# Patient Record
Sex: FEMALE | Race: BLACK OR AFRICAN AMERICAN | NOT HISPANIC OR LATINO | Employment: OTHER | URBAN - METROPOLITAN AREA
[De-identification: names, ages, dates, MRNs, and addresses within clinical notes are randomized per-mention and may not be internally consistent; named-entity substitution may affect disease eponyms.]

---

## 2017-01-02 ENCOUNTER — TELEPHONE (OUTPATIENT)
Dept: FAMILY MEDICINE | Facility: CLINIC | Age: 72
End: 2017-01-02

## 2017-01-02 RX ORDER — CIPROFLOXACIN 500 MG/1
500 TABLET ORAL 2 TIMES DAILY
Qty: 20 TABLET | Refills: 0 | Status: SHIPPED | OUTPATIENT
Start: 2017-01-02 | End: 2017-01-03

## 2017-01-03 ENCOUNTER — TELEPHONE (OUTPATIENT)
Dept: FAMILY MEDICINE | Facility: CLINIC | Age: 72
End: 2017-01-03

## 2017-01-03 DIAGNOSIS — N30.00 ACUTE CYSTITIS WITHOUT HEMATURIA: Primary | ICD-10-CM

## 2017-01-03 RX ORDER — NITROFURANTOIN 25; 75 MG/1; MG/1
100 CAPSULE ORAL 2 TIMES DAILY
Qty: 20 CAPSULE | Refills: 0 | Status: SHIPPED | OUTPATIENT
Start: 2017-01-03 | End: 2017-01-13

## 2017-01-03 NOTE — TELEPHONE ENCOUNTER
Daughter notified. Stated she picked up the medication this morning and started patient on it. Patient will come in in 2 weeks for follow up urine

## 2017-01-03 NOTE — TELEPHONE ENCOUNTER
Left message informing daughter of new medication    UA and UC pending schedule for 2 weeks when signed

## 2017-01-03 NOTE — TELEPHONE ENCOUNTER
The bacteria in her urine are resistant to cipro so I will change this to nitrofurantoin.  Call her daughter Hallie to tell her to change.  Recheck a Urinalysis and culture in 2 weeks.    Orders Placed This Encounter   Procedures    Urine culture     Check this 2 weeks after a UTI to get proof of cure.     Standing Status:   Future     Standing Expiration Date:   3/4/2018    Urinalysis     Check this 2 weeks after a UTI to get proof of cure.     Standing Status:   Future     Standing Expiration Date:   1/3/2018       Medications Ordered This Encounter      nitrofurantoin, macrocrystal-monohydrate, (MACROBID) 100 MG capsule          Sig: Take 1 capsule (100 mg total) by mouth 2 (two) times daily.          Dispense:  20 capsule          Refill:  0

## 2017-01-10 NOTE — TELEPHONE ENCOUNTER
I have signed for the following orders AND/OR meds.  Please call the patient and ask the patient to schedule the testing AND/OR inform about any medications that were sent.      Orders Placed This Encounter   Procedures    Urine culture     Check this 2 weeks after a UTI to get proof of cure.     Standing Status:   Future     Standing Expiration Date:   3/4/2018    Urine culture     Standing Status:   Future     Standing Expiration Date:   3/10/2018    Urinalysis     Check this 2 weeks after a UTI to get proof of cure.     Standing Status:   Future     Standing Expiration Date:   1/3/2018    URINALYSIS     Standing Status:   Future     Standing Expiration Date:   3/10/2018         Medications Ordered This Encounter      nitrofurantoin, macrocrystal-monohydrate, (MACROBID) 100 MG capsule          Sig: Take 1 capsule (100 mg total) by mouth 2 (two) times daily.          Dispense:  20 capsule          Refill:  0

## 2017-01-31 ENCOUNTER — TELEPHONE (OUTPATIENT)
Dept: FAMILY MEDICINE | Facility: CLINIC | Age: 72
End: 2017-01-31

## 2017-01-31 NOTE — TELEPHONE ENCOUNTER
----- Message from Magdalena Warner sent at 1/31/2017  9:16 AM CST -----  Contact: daughter - Hallie  [phone]  Pt has been leaning to the right and seems to be weak on that side but does not appear to be in pain.  Pt does seem to be off some how possibly worsening of her dementia.  I scheduled her for first available in [place] with Dr Frost today at 2:20 but Ms Sterling would like to know if there is anyone who could possibly see her this morning some time.  Please call Ms Sterling ASAP this morning at [phone] to offer possible appt access.

## 2017-02-01 ENCOUNTER — TELEPHONE (OUTPATIENT)
Dept: FAMILY MEDICINE | Facility: CLINIC | Age: 72
End: 2017-02-01

## 2017-02-01 NOTE — TELEPHONE ENCOUNTER
----- Message from Karon Magallon sent at 2/1/2017 11:08 AM CST -----  Contact: pt daughter - sarah   States she needs to have orders for an in and out ua due to pt having an UTI a few weeks ago and can be reached at [phone]//thanks/dbw

## 2017-02-16 ENCOUNTER — TELEPHONE (OUTPATIENT)
Dept: FAMILY MEDICINE | Facility: CLINIC | Age: 72
End: 2017-02-16

## 2017-02-16 NOTE — TELEPHONE ENCOUNTER
----- Message from Nemo Gallo sent at 2/15/2017  1:34 PM CST -----  Contact: Pt Daughter/Hallie  Caller request call from nurse to get an order to get pt a wheelchair High back that reclines, please contact caller at [phone]

## 2017-03-17 ENCOUNTER — TELEPHONE (OUTPATIENT)
Dept: FAMILY MEDICINE | Facility: CLINIC | Age: 72
End: 2017-03-17

## 2017-03-17 NOTE — TELEPHONE ENCOUNTER
Check a cmp, cbc, urinalysis and urine culture now.  This is done through her home heatlh.  Call me report today.

## 2017-03-17 NOTE — TELEPHONE ENCOUNTER
----- Message from Magdalena Wraner sent at 3/16/2017  4:54 PM CDT -----  Contact: Cristina - caregiver  [phone] or [phone]  Pt has been running fever at night and having runny nose and she has been lethargic much like the last time you did labs and she ended up having an infection so they would like you to order blood and urine labs ploease.

## 2017-03-17 NOTE — TELEPHONE ENCOUNTER
Left verbal orders with elena at home health they state they will do it this evening and call with results

## 2017-03-20 ENCOUNTER — TELEPHONE (OUTPATIENT)
Dept: FAMILY MEDICINE | Facility: CLINIC | Age: 72
End: 2017-03-20

## 2017-03-20 NOTE — TELEPHONE ENCOUNTER
----- Message from Cristina Canales sent at 3/17/2017  1:49 PM CDT -----  Contact: Connie/[facility]  States she needs to speak to Duyen regarding a signed lab order with diagnosis, the patient is going to the hospital to have it done. Please fax to [phone].  Soon would be better than later if possible. Please call Connie at [phone]. Thank you

## 2017-03-29 RX ORDER — AMOXICILLIN AND CLAVULANATE POTASSIUM 875; 125 MG/1; MG/1
1 TABLET, FILM COATED ORAL 2 TIMES DAILY
Qty: 20 TABLET | Refills: 0 | Status: CANCELLED | OUTPATIENT
Start: 2017-03-29

## 2017-03-29 NOTE — TELEPHONE ENCOUNTER
----- Message from Ami Coffey sent at 3/29/2017  2:30 PM CDT -----  Contact: Hallie/daughter  Hallie called and stated that someone called her this morning and advised her that a prescription will be called into the pharmacy. She has been calling the pharmacy and sent someone to the pharmacy and nothing is there for her.     [facility] [address] [address]  Phone: [phone] Fax: [phone]    She can be contacted at [phone].    Thanks,  Ami

## 2017-03-29 NOTE — TELEPHONE ENCOUNTER
Patient had a UTI per DR. Frost from outside labs at [place]. I sent in her prescription, and notified her daughter. I spoke with Lesia, at [facility], and advised per verbal order to do a urinalysis with culture and sensitivity in 2 weeks. She verbalized understanding.

## 2017-03-30 RX ORDER — AMOXICILLIN AND CLAVULANATE POTASSIUM 875; 125 MG/1; MG/1
1 TABLET, FILM COATED ORAL 2 TIMES DAILY
Qty: 20 TABLET | Refills: 0 | Status: SHIPPED | OUTPATIENT
Start: 2017-03-30 | End: 2017-04-26

## 2017-04-04 RX ORDER — FERROUS GLUCONATE 324(38)MG
TABLET ORAL
Qty: 28 TABLET | Refills: 11 | Status: SHIPPED | OUTPATIENT
Start: 2017-04-04 | End: 2017-10-18 | Stop reason: SDUPTHER

## 2017-04-04 RX ORDER — FOLIC ACID 1 MG/1
TABLET ORAL
Qty: 28 TABLET | Refills: 11 | Status: SHIPPED | OUTPATIENT
Start: 2017-04-04 | End: 2017-10-18 | Stop reason: SDUPTHER

## 2017-04-25 ENCOUNTER — TELEPHONE (OUTPATIENT)
Dept: FAMILY MEDICINE | Facility: CLINIC | Age: 72
End: 2017-04-25

## 2017-04-25 NOTE — TELEPHONE ENCOUNTER
----- Message from Sabine Carlos sent at 4/25/2017 10:02 AM CDT -----  Contact: devendra-daughter  bloodwork results needed, also has blemishes & warm (was in hosp for 2 mths last time this happene). pls adv..[phone]

## 2017-04-25 NOTE — TELEPHONE ENCOUNTER
Patient is being seen tomorrow for rash. Patient's daughter is wanting results from blood work taken at hospital

## 2017-04-26 ENCOUNTER — OFFICE VISIT (OUTPATIENT)
Dept: FAMILY MEDICINE | Facility: CLINIC | Age: 72
End: 2017-04-26
Payer: MEDICARE

## 2017-04-26 DIAGNOSIS — L30.9 DERMATITIS: Primary | ICD-10-CM

## 2017-04-26 PROCEDURE — 99213 OFFICE O/P EST LOW 20 MIN: CPT | Mod: PBBFAC,PO

## 2017-04-26 PROCEDURE — 99999 PR PBB SHADOW E&M-EST. PATIENT-LVL III: CPT | Mod: PBBFAC,,,

## 2017-04-26 PROCEDURE — 99212 OFFICE O/P EST SF 10 MIN: CPT | Mod: S$PBB,,,

## 2017-04-26 RX ORDER — CLOTRIMAZOLE AND BETAMETHASONE DIPROPIONATE 10; .64 MG/G; MG/G
CREAM TOPICAL 2 TIMES DAILY
Qty: 45 G | Refills: 0 | Status: SHIPPED | OUTPATIENT
Start: 2017-04-26 | End: 2017-05-10

## 2017-04-26 NOTE — PROGRESS NOTES
Subjective:       Patient ID: Makeda Chowdhury is a 72 y.o. female.    Chief Complaint: Rash    HPI   The patient presents with her daughter who states that the patient has had a rash for a bout one week. The says the rash is constant and mild. She says the patietn has been scratching at the rash.     Review of Systems   Unable to perform ROS: Dementia         Objective:      Physical Exam   Constitutional: She appears well-developed and well-nourished. She is uncooperative.   HENT:   Head: Normocephalic and atraumatic.   Eyes: Conjunctivae are normal. Pupils are equal, round, and reactive to light. No scleral icterus.   Neck: Normal range of motion. Neck supple.   Cardiovascular: Normal rate, regular rhythm, normal heart sounds and intact distal pulses.  Exam reveals no gallop and no friction rub.    No murmur heard.  Pulmonary/Chest: Effort normal and breath sounds normal. No respiratory distress. She has no wheezes. She has no rales. She exhibits no tenderness.   Musculoskeletal: Normal range of motion.   Lymphadenopathy:     She has no cervical adenopathy.   Neurological: She is alert. She is disoriented. She exhibits normal muscle tone. Coordination normal.   Skin: Skin is warm and dry. Rash noted. Rash is maculopapular. No erythema. No pallor.        Psychiatric: She is noncommunicative.   The exhibits non-communicative speech. She mumbles barely discernable words and is unable to convey a message.    Vitals reviewed.      Assessment:       1. Dermatitis          Plan:   Dermatitis  -     clotrimazole-betamethasone 1-0.05% (LOTRISONE) cream; Apply topically 2 (two) times daily.  Dispense: 45 g; Refill: 0        -     Advised her daughter to use a skin protective balm on areas with rash        Disclaimer: This note is prepared using voice recognition software.  As such there may be errors in the dictation.  It has not been proofread.

## 2017-04-28 ENCOUNTER — TELEPHONE (OUTPATIENT)
Dept: FAMILY MEDICINE | Facility: CLINIC | Age: 72
End: 2017-04-28

## 2017-04-28 NOTE — TELEPHONE ENCOUNTER
Pt was seen by alma delia on 4/26/17    She states the rash is getting worse and she has phase 2 pressure ulcer on her sacrum area. She states they are using ointment but does not seem to be helping. They think she is running fever in the evening. They are unsure what is going on with her. They said she is more fatigued than normal and her urine output has decreased. The daughter wants labs done. The nurse thinks they should take her to [facility].

## 2017-04-28 NOTE — TELEPHONE ENCOUNTER
----- Message from Pari Maddox sent at 4/28/2017  2:40 PM CDT -----  Contact: devendra/ frances  [phone]  States that she needs to speak to nurse regarding pt. States that she would like labs order for pt. States that pt has broken out in a rash that is blisters and a stage 2 pressure sore in sacral area. Also states that pt bottom was red and warm to touch. Please call back at [phone]//thank you acc

## 2017-05-04 DIAGNOSIS — N39.0 RECURRENT UTI: Primary | ICD-10-CM

## 2017-05-04 RX ORDER — SULFAMETHOXAZOLE AND TRIMETHOPRIM 800; 160 MG/1; MG/1
1 TABLET ORAL 2 TIMES DAILY
Qty: 20 TABLET | Refills: 0 | Status: SHIPPED | OUTPATIENT
Start: 2017-05-04 | End: 2017-10-23

## 2017-05-04 NOTE — TELEPHONE ENCOUNTER
Pt daughter states Patient is seeing Dr. White for urology already. I will fax these results to his office and pt daughter will call him and have ms stark seen.       MEDICATION PENDING PER PAPERWORK

## 2017-05-05 NOTE — TELEPHONE ENCOUNTER
I have signed for the following orders AND/OR meds.  Please call the patient and ask the patient to schedule the testing AND/OR inform about any medications that were sent.      Orders Placed This Encounter   Procedures    Ambulatory consult to Urology     Referral Priority:   Routine     Referral Type:   Consultation     Referral Reason:   Specialty Services Required     Requested Specialty:   Urology     Number of Visits Requested:   1         Medications Ordered This Encounter      sulfamethoxazole-trimethoprim 800-160mg (BACTRIM DS) 800-160 mg Tab          Sig: Take 1 tablet by mouth 2 (two) times daily.          Dispense:  20 tablet          Refill:  0

## 2017-05-11 ENCOUNTER — TELEPHONE (OUTPATIENT)
Dept: FAMILY MEDICINE | Facility: CLINIC | Age: 72
End: 2017-05-11

## 2017-05-11 NOTE — TELEPHONE ENCOUNTER
----- Message from Vanessa Mcallister sent at 5/11/2017  9:58 AM CDT -----  Contact: Hallie/daughter  Hallie would like to know if Dr. Frost can order Rehab, Please call her at [phone].    Thanks  td

## 2017-05-16 ENCOUNTER — TELEPHONE (OUTPATIENT)
Dept: FAMILY MEDICINE | Facility: CLINIC | Age: 72
End: 2017-05-16

## 2017-05-16 NOTE — TELEPHONE ENCOUNTER
Patients daughter susan would like a phone call from you to discuss what is the next best step for Ms. Ashford. She just came out of the hospital.

## 2017-05-16 NOTE — TELEPHONE ENCOUNTER
I spent 17 minutes with her daughter going through the [facility] chart with her explaining what it appears that she is being treated for at this time.  Pneumonia and UTI are being treated along with alzheimer's and CKD.    She is going to be discharged.  Hallie is going to call [facility] to see what they have to offer as far as rehab in the future after her infection is better.

## 2017-05-16 NOTE — TELEPHONE ENCOUNTER
----- Message from Liliana Lopes sent at 5/16/2017  8:47 AM CDT -----  Contact: Hallie/daughter  Hallie is requesting to speak to the nurse regarding having pt placed in a nursing rehabilitation facility. Pt is being discharged from the hospital today. Pls call Hallie back at [phone].

## 2017-05-22 ENCOUNTER — PATIENT OUTREACH (OUTPATIENT)
Dept: ADMINISTRATIVE | Facility: CLINIC | Age: 72
End: 2017-05-22
Payer: MEDICARE

## 2017-05-22 NOTE — PROGRESS NOTES
Discharge Information     Discharge Date:  5/16/17          Primary Discharge Diagnosis:  Urinary tract infection          Chelsea Gutierrez RN attempted to contact patient. No answer. The following message was left for the patient to return the call:  Good morning,  I am a nurse calling on behalf of [facility] from the [facility].  This is a Transitional Care Call for Makeda Chowdhury. When you have a moment please contact us at [phone] or [phone] Monday through Friday, between the hours of 8 am to 4 pm. We look forward to speaking with you. On behalf of [facility] have a nice day.    The patient has a scheduled HOSFU appointment with Inessa Bowens on 5/25/17 @ [id number]. Message sent to Physician staff.

## 2017-05-31 ENCOUNTER — TELEPHONE (OUTPATIENT)
Dept: FAMILY MEDICINE | Facility: CLINIC | Age: 72
End: 2017-05-31

## 2017-05-31 RX ORDER — NYSTATIN 100000 [USP'U]/G
POWDER TOPICAL 4 TIMES DAILY
Qty: 60 G | Refills: 2 | Status: SHIPPED | OUTPATIENT
Start: 2017-05-31 | End: 2017-10-23 | Stop reason: SDUPTHER

## 2017-05-31 NOTE — TELEPHONE ENCOUNTER
----- Message from Mgadalena Warner sent at 5/31/2017  2:45 PM CDT -----  Contact: daughter - Hallie  Pt was supposed to have a hosp f/u appt yest but they just couldn't get her ready and out of the house to make it there because it is just getting harder and harder to get her any where.  Ms Sterling is wondering if Home Health orders could be sent to [facility] to collect the U/A to check for the UTI f/u?  Please call Ms Sterling back at [phone]  She also needs a refill on Nystatin Powder and the eyedrops for her eye for pink eye sent to the [facility] in [place].

## 2017-05-31 NOTE — TELEPHONE ENCOUNTER
----- Message from Jia Bender sent at 5/31/2017  2:45 PM CDT -----  Ingris with [facility] at [phone]//[facility] is calling to get an order to do and in and out catheter to obtain UA/ C & S//Please Call//thanks/lh

## 2017-06-01 ENCOUNTER — TELEPHONE (OUTPATIENT)
Dept: FAMILY MEDICINE | Facility: CLINIC | Age: 72
End: 2017-06-01

## 2017-06-01 RX ORDER — FLUCONAZOLE 100 MG/1
100 TABLET ORAL DAILY
Qty: 10 TABLET | Refills: 0 | Status: SHIPPED | OUTPATIENT
Start: 2017-06-01 | End: 2017-06-08

## 2017-06-01 RX ORDER — CLOTRIMAZOLE AND BETAMETHASONE DIPROPIONATE 10; .64 MG/G; MG/G
CREAM TOPICAL 2 TIMES DAILY
Qty: 45 G | OUTPATIENT
Start: 2017-06-01 | End: 2017-10-23 | Stop reason: SDUPTHER

## 2017-06-01 NOTE — TELEPHONE ENCOUNTER
Ask her to consider using a dermatologist if the skin is sloughing off at this time.        I have signed for the following orders AND/OR meds.  Please call the patient and ask the patient to schedule the testing AND/OR inform about any medications that were sent.      No orders of the defined types were placed in this encounter.        Medications Ordered This Encounter      clotrimazole-betamethasone 1-0.05% (LOTRISONE) cream          Sig: Apply topically 2 (two) times daily.          Dispense:  45 g          Refill:  1d      fluconazole (DIFLUCAN) 100 MG tablet          Sig: Take 1 tablet (100 mg total) by mouth once daily.          Dispense:  10 tablet          Refill:  0

## 2017-06-01 NOTE — TELEPHONE ENCOUNTER
Patient daughter thinks that patient may have a yeast infection. When they bathe the patient she has white caked up under her arms and between her legs and when wiping that off they said it was as if her skin was being rubbed off too. Please advise

## 2017-06-01 NOTE — TELEPHONE ENCOUNTER
----- Message from Pari Maddox sent at 6/1/2017 11:14 AM CDT -----  Contact: susan/daughter [phone]  States that she called and left message yesterday and no one called her back. States the pt needs orders for a UA sent to [facility]. States that pt needs refill on nystat powder and medication for her eye. Please call back at [phone]//thank you acc

## 2017-06-02 ENCOUNTER — TELEPHONE (OUTPATIENT)
Dept: FAMILY MEDICINE | Facility: CLINIC | Age: 72
End: 2017-06-02

## 2017-06-02 NOTE — TELEPHONE ENCOUNTER
----- Message from Karon Magallon sent at 6/2/2017 10:09 AM CDT -----  Contact: pt daughter - susan   States she is rtn nurses call and can be reached at [phone]//thanks/dbw

## 2017-06-02 NOTE — TELEPHONE ENCOUNTER
Patient caretaker states she needs a mattress for the pressure uclers  She also needs some drops for her eyes because they are draining  She also wants a pad to put under patient for cushion.  She states she can not bring the patient up here because she can not get her in and out of the house.   Please advise                     Please approve for her to get a mattress and pad for her pressure ulcers through the home health company.  They should be turning her every 2 hours.  Please ask the family if they have someone to do that and if not, she should be evaluated for placement in a facility to care for her appropriately.      As for the eyes, use over the counter saline eye drops that.  If an RX can be called in for this, use 1 squirt every 6 hours for the eyes.

## 2017-06-02 NOTE — TELEPHONE ENCOUNTER
Patient caretaker states she needs a mattress for the pressure uclers  She also needs some drops for her eyes because they are draining  She also wants a pad to put under patient for cushion.  She states she can not bring the patient up here because she can not get her in and out of the house.   Please advise

## 2017-06-05 NOTE — TELEPHONE ENCOUNTER
I do not know because I do not know what she has.  This really needs to be evaluated because this is not normal.

## 2017-06-05 NOTE — TELEPHONE ENCOUNTER
"susan patient's caretaker states that they have someone to turn her every 6 hours but she still has these issues. They want an order for a BED and a MATTRESS.   Also, they state they have been using over the counter eye drops and you can still see the  "cold" draining in from her eyes  "

## 2017-06-05 NOTE — TELEPHONE ENCOUNTER
OTC saline eye drops.  If they have used that, see an eye doc.    Ask her to consider hospice given her bed sores and general debilitation at this point.

## 2017-06-05 NOTE — TELEPHONE ENCOUNTER
Every 6 hours is not standard of care.  It must be every 2 hours so this is why she has bed sores.  She is not being turned enough.  I approved the order for the bed last week.  Where is the order and who is addressing it?   She may need to be moved to a higher level of care than she can be rendered in a home setting at this time.  Ask susan to consider this.    I do not know what to do more about cold coming from the eyes. She needs to be evaluated to tell more.  Who is her eye doc?

## 2017-06-05 NOTE — TELEPHONE ENCOUNTER
I apologize that was an error on my end she is being turned every 2 hours. What was the rx you said you can call in for her eyes in the previous messages. You told me 1 squirt every 6 hours, but you did not tell me what med.

## 2017-06-06 ENCOUNTER — TELEPHONE (OUTPATIENT)
Dept: FAMILY MEDICINE | Facility: CLINIC | Age: 72
End: 2017-06-06

## 2017-06-06 NOTE — TELEPHONE ENCOUNTER
Gave verbal order for mattress and pad to trudy figueredo [facility]

## 2017-06-08 ENCOUNTER — TELEPHONE (OUTPATIENT)
Dept: FAMILY MEDICINE | Facility: CLINIC | Age: 72
End: 2017-06-08

## 2017-06-08 DIAGNOSIS — F02.80 ALZHEIMER'S DEMENTIA WITHOUT BEHAVIORAL DISTURBANCE, UNSPECIFIED TIMING OF DEMENTIA ONSET: Primary | ICD-10-CM

## 2017-06-08 DIAGNOSIS — G30.9 ALZHEIMER'S DEMENTIA WITHOUT BEHAVIORAL DISTURBANCE, UNSPECIFIED TIMING OF DEMENTIA ONSET: Primary | ICD-10-CM

## 2017-06-09 NOTE — TELEPHONE ENCOUNTER
I have signed for the following orders AND/OR meds.  Please call the patient and ask the patient to schedule the testing AND/OR inform about any medications that were sent.      Orders Placed This Encounter   Procedures    Ambulatory referral to Hospice     Referral Priority:   Routine     Referral Type:   Consultation     Referral Reason:   Specialty Services Required     Referred to Provider:   [facility]     Requested Specialty:   Hospice and Palliative Medicine     Number of Visits Requested:   1

## 2017-06-26 RX ORDER — QUETIAPINE FUMARATE 50 MG/1
TABLET, FILM COATED ORAL
Qty: 56 TABLET | Refills: 0 | Status: SHIPPED | OUTPATIENT
Start: 2017-06-26 | End: 2017-07-24 | Stop reason: SDUPTHER

## 2017-06-26 RX ORDER — OMEPRAZOLE 20 MG/1
CAPSULE, DELAYED RELEASE ORAL
Qty: 28 CAPSULE | Refills: 0 | Status: SHIPPED | OUTPATIENT
Start: 2017-06-26 | End: 2017-07-24 | Stop reason: SDUPTHER

## 2017-06-26 RX ORDER — LISINOPRIL 2.5 MG/1
TABLET ORAL
Qty: 28 TABLET | Refills: 0 | Status: SHIPPED | OUTPATIENT
Start: 2017-06-26 | End: 2017-07-24 | Stop reason: SDUPTHER

## 2017-06-26 RX ORDER — TRAZODONE HYDROCHLORIDE 50 MG/1
TABLET ORAL
Qty: 28 TABLET | Refills: 0 | Status: SHIPPED | OUTPATIENT
Start: 2017-06-26 | End: 2017-07-24 | Stop reason: SDUPTHER

## 2017-06-26 RX ORDER — METOPROLOL SUCCINATE 100 MG/1
TABLET, EXTENDED RELEASE ORAL
Qty: 28 TABLET | Refills: 0 | Status: SHIPPED | OUTPATIENT
Start: 2017-06-26 | End: 2017-07-24 | Stop reason: SDUPTHER

## 2017-06-26 RX ORDER — LEVOTHYROXINE SODIUM 25 UG/1
TABLET ORAL
Qty: 28 TABLET | Refills: 0 | Status: SHIPPED | OUTPATIENT
Start: 2017-06-26 | End: 2017-07-24 | Stop reason: SDUPTHER

## 2017-06-26 RX ORDER — CLONIDINE HYDROCHLORIDE 0.1 MG/1
TABLET ORAL
Qty: 56 TABLET | Refills: 0 | Status: SHIPPED | OUTPATIENT
Start: 2017-06-26 | End: 2017-07-24 | Stop reason: SDUPTHER

## 2017-06-26 RX ORDER — LEVETIRACETAM 500 MG/1
TABLET ORAL
Qty: 56 TABLET | Refills: 0 | Status: SHIPPED | OUTPATIENT
Start: 2017-06-26 | End: 2017-07-24 | Stop reason: SDUPTHER

## 2017-06-28 NOTE — TELEPHONE ENCOUNTER
Patient is now on hospice. Would you like for me to send the lab orders to the hospice nurse and have her draw these?

## 2017-07-25 RX ORDER — METOPROLOL SUCCINATE 100 MG/1
TABLET, EXTENDED RELEASE ORAL
Qty: 28 TABLET | Refills: 11 | Status: SHIPPED | OUTPATIENT
Start: 2017-07-25

## 2017-07-25 RX ORDER — TRAZODONE HYDROCHLORIDE 50 MG/1
TABLET ORAL
Qty: 28 TABLET | Refills: 11 | Status: SHIPPED | OUTPATIENT
Start: 2017-07-25

## 2017-07-25 RX ORDER — LEVETIRACETAM 500 MG/1
TABLET ORAL
Qty: 56 TABLET | Refills: 11 | Status: SHIPPED | OUTPATIENT
Start: 2017-07-25

## 2017-07-25 RX ORDER — LEVOTHYROXINE SODIUM 25 UG/1
TABLET ORAL
Qty: 28 TABLET | Refills: 11 | Status: SHIPPED | OUTPATIENT
Start: 2017-07-25

## 2017-07-25 RX ORDER — CLONIDINE HYDROCHLORIDE 0.1 MG/1
TABLET ORAL
Qty: 56 TABLET | Refills: 11 | Status: SHIPPED | OUTPATIENT
Start: 2017-07-25

## 2017-07-25 RX ORDER — QUETIAPINE FUMARATE 50 MG/1
TABLET, FILM COATED ORAL
Qty: 56 TABLET | Refills: 11 | Status: SHIPPED | OUTPATIENT
Start: 2017-07-25

## 2017-07-25 RX ORDER — LISINOPRIL 2.5 MG/1
TABLET ORAL
Qty: 28 TABLET | Refills: 11 | Status: SHIPPED | OUTPATIENT
Start: 2017-07-25

## 2017-07-25 RX ORDER — OMEPRAZOLE 20 MG/1
CAPSULE, DELAYED RELEASE ORAL
Qty: 28 CAPSULE | Refills: 11 | Status: SHIPPED | OUTPATIENT
Start: 2017-07-25

## 2017-10-18 RX ORDER — FOLIC ACID 1 MG/1
TABLET ORAL
Qty: 28 TABLET | Refills: 0 | Status: SHIPPED | OUTPATIENT
Start: 2017-10-18 | End: 2017-11-14 | Stop reason: SDUPTHER

## 2017-10-18 RX ORDER — FERROUS GLUCONATE 324(38)MG
TABLET ORAL
Qty: 28 TABLET | Refills: 0 | Status: SHIPPED | OUTPATIENT
Start: 2017-10-18 | End: 2017-10-19 | Stop reason: SDUPTHER

## 2017-10-18 RX ORDER — LORATADINE 10 MG/1
TABLET ORAL
Qty: 28 TABLET | Refills: 0 | Status: SHIPPED | OUTPATIENT
Start: 2017-10-18 | End: 2017-10-19

## 2017-10-19 RX ORDER — FERROUS GLUCONATE 324(38)MG
TABLET ORAL
Qty: 28 TABLET | Refills: 11 | Status: SHIPPED | OUTPATIENT
Start: 2017-10-19

## 2017-10-19 RX ORDER — LORATADINE 10 MG/1
TABLET ORAL
Qty: 28 TABLET | Refills: 11 | Status: SHIPPED | OUTPATIENT
Start: 2017-10-19

## 2017-10-19 NOTE — TELEPHONE ENCOUNTER
After Visit Summary   8/29/2017    Hannah Choudhary    MRN: [id number]           Patient Information     Date Of Birth          1947        Visit Information        Provider Department      8/29/2017 11:15 AM Grey Martinez APRN CNP [facility]        Care Instructions    August 29, 2017    Dear Ms. Hannah Choudhary,    Thank you for coming today.  During your visit, we have discussed the following:     __  Continue taking Aricept 10 mg daily.   __  Continue using walker all the time.   __  Call Felicitas Biggs, our  to get more resources in getting help around the house.   __  Return in 6 months. You may return sooner as needed.      For questions, you may send us a [employer] message or call [phone]    Fax number: [phone]    GREG Chaney CNP  [facility]            Follow-ups after your visit        Your next 10 appointments already scheduled     Mar 06, 2018 11:15 AM CST   (Arrive by 11:00 AM)   Return Movement Disorder with GREG Alvarado CNP   [facility] ([facility])    [address] [place] [id number] [phone]              Who to contact     Please call your clinic at [phone] to:    Ask questions about your health    Make or cancel appointments    Discuss your medicines    Learn about your test results    Speak to your doctor   If you have compliments or concerns about an experience at your clinic, or if you wish to file a complaint, please contact [facility] Patient Relations at [phone] or email us at [email or web address]         Additional Information About Your Visit        [employer] Information     [employer] is an electronic gateway that provides easy, online access to your medical records. With [employer] you can request a clinic appointment, read your test results, renew a prescription or communicate with your care team.    I have refilled the patient's requested medication x 1 month.  However, the patient is due for an evaluation in the office.  Call the patient on the phone and book the patient with EITHER Rogelio Daniels NP or Himanshu Wells NP for a visit.    PLEASE DOCUMENT THE FACT THAT YOU HAVE CONTACTED THE PATIENT IN THE CHART FOR FUTURE REFERENCE.    Health Maintenance Due   Topic Date Due    Hepatitis C Screening  1945    TETANUS VACCINE  03/10/1965    DEXA SCAN  03/10/1987    Zoster Vaccine  03/10/2007    Colonoscopy  07/31/2012    Mammogram  09/10/2013    Pneumococcal (65+) (2 of 2 - PCV13) 03/04/2014    Lipid Panel  11/04/2015    Influenza Vaccine  08/01/2017      "  To sign up for [employer] visit the website at [email or web address]   You will be asked to enter the access code listed below, as well as some personal information. Please follow the directions to create your username and password.     Your access code is: [email or web address]  Expires: 2017  6:30 AM     Your access code will  in 90 days. If you need help or a new code, please contact your [facility] or call [phone] for assistance.        Care EveryWhere ID     This is your Care EveryWhere ID. This could be used by other organizations to access your [facility] medical records  [id number]        Your Vitals Were     Pulse Temperature Respirations Height Pulse Oximetry Breastfeeding?    55 98.1  F (36.7  C) 20 1.626 m (5' 4\") 96% No    BMI (Body Mass Index)                   28.46 kg/m2            Blood Pressure from Last 3 Encounters:   17 134/75   17 154/76   16 152/79    Weight from Last 3 Encounters:   17 75.2 kg (165 lb 12.8 oz)   17 78.9 kg (174 lb)              Today, you had the following     No orders found for display       Primary Care Provider Office Phone # Fax #    Niurka MASSEY Cameron [phone]       [facility] [address] [address]        Equal Access to Services     [facility] : Hadii aad ku hadasho Soomaali, waaxda luqadaha, qaybta kaalmada adeegyada, yung powers hayrosan malcolm cruz . So [facility] [phone].    ATENCIÓN: Si habla español, tiene a harris disposición servicios gratuitos de asistencia lingüística. Llgina al [phone].    We comply with applicable federal civil rights laws and [place] laws. We do not discriminate on the basis of race, color, national origin, age, disability sex, sexual orientation or gender identity.            Thank you!     Thank you for choosing [facility]  for your care. Our goal is always to provide you with excellent care. " Hearing back from our patients is one way we can continue to improve our services. Please take a few minutes to complete the written survey that you may receive in the mail after your visit with us. Thank you!             Your Updated Medication List - Protect others around you: Learn how to safely use, store and throw away your medicines at www.disposemymeds.org.          This list is accurate as of: 8/29/17 12:38 PM.  Always use your most recent med list.                   Brand Name Dispense Instructions for use Diagnosis    acetaminophen 500 MG tablet    TYLENOL          * albuterol (2.5 MG/3ML) 0.083% neb solution     360 mL    Take 1 vial (2.5 mg) by nebulization every 6 hours as needed for shortness of breath / dyspnea or wheezing        * PROAIR  (90 BASE) MCG/ACT Inhaler   Generic drug:  albuterol      Inhale 2 puffs into the lungs every 6 hours        aspirin 81 MG chewable tablet     108 tablet    Take 1 tablet (81 mg) by mouth daily        benzoyl peroxide 10 % topical gel      Apply topically daily        * TRUEPLUS LANCETS 28G Misc      1 each        * blood glucose monitoring lancets           cetirizine 10 MG tablet    zyrTEC    30 tablet    Take 1 tablet (10 mg) by mouth every evening        clonazePAM 1 MG tablet    klonoPIN    20 tablet    Take 1 tablet (1 mg) by mouth 2 times daily as needed for anxiety        COMPRESSION STOCKINGS           donepezil 5 MG tablet    ARICEPT    60 tablet    Take 5 mg/day -- 1 tablet daily for 1 month, then increase dose 10 mg/day (2 tablets) -    Mild dementia       GLUCAGON EMERGENCY 1 MG kit   Generic drug:  glucagon      1 mg        HYDROcodone-acetaminophen 7.5-325 MG per tablet    NORCO    20 tablet    maximum  tablet(s) per day        lidocaine 5 % Patch    LIDODERM    30 patch    Place 1 patch onto the skin every 24 hours        lisinopril 10 MG tablet    PRINIVIL/ZESTRIL    30 tablet    Take 1 tablet (10 mg) by mouth daily        metFORMIN 500 MG  tablet    GLUCOPHAGE    60 tablet    Take 1 tablet (500 mg) by mouth daily (with breakfast)        MEVACOR 20 MG tablet   Generic drug:  lovastatin     30 tablet    Take 1 tablet (20 mg) by mouth At Bedtime        * NASONEX 50 MCG/ACT spray   Generic drug:  mometasone     1 Box    Spray 2 sprays into both nostrils daily        * NASONEX 50 MCG/ACT spray   Generic drug:  mometasone     1 Box    Spray 2 sprays into both nostrils daily        PARoxetine 20 MG tablet    PAXIL    30 tablet    1 in the morning and 2 in the evening        polyethylene glycol powder    MIRALAX/GLYCOLAX     Take 17 g by mouth        ranitidine 150 MG tablet    ZANTAC    60 tablet    Take 1 tablet (150 mg) by mouth 2 times daily        senna 8.6 MG tablet    SENOKOT    120 tablet    Take 3 tablets by mouth At Bedtime        traZODone 50 MG tablet    DESYREL    90 tablet    Take 1 tablet (50 mg) by mouth nightly as needed for sleep        TRUE METRIX AIR GLUCOSE METER W/DEVICE Kit           verapamil 240 MG CR tablet    CALAN-SR    30 tablet    Take 1 tablet (240 mg) by mouth daily        * Notice:  This list has 6 medication(s) that are the same as other medications prescribed for you. Read the directions carefully, and ask your doctor or other care provider to review them with you.

## 2017-10-23 ENCOUNTER — OFFICE VISIT (OUTPATIENT)
Dept: FAMILY MEDICINE | Facility: CLINIC | Age: 72
End: 2017-10-23
Payer: MEDICARE

## 2017-10-23 VITALS
BODY MASS INDEX: 22.5 KG/M2 | TEMPERATURE: 96 F | HEIGHT: 66 IN | DIASTOLIC BLOOD PRESSURE: 85 MMHG | WEIGHT: 140 LBS | SYSTOLIC BLOOD PRESSURE: 144 MMHG | HEART RATE: 65 BPM

## 2017-10-23 DIAGNOSIS — F02.81 ALZHEIMER'S DEMENTIA WITH BEHAVIORAL DISTURBANCE, UNSPECIFIED TIMING OF DEMENTIA ONSET: Primary | ICD-10-CM

## 2017-10-23 DIAGNOSIS — G47.00 INSOMNIA, UNSPECIFIED TYPE: ICD-10-CM

## 2017-10-23 DIAGNOSIS — E03.4 HYPOTHYROIDISM DUE TO ACQUIRED ATROPHY OF THYROID: ICD-10-CM

## 2017-10-23 DIAGNOSIS — G30.9 ALZHEIMER'S DEMENTIA WITH BEHAVIORAL DISTURBANCE, UNSPECIFIED TIMING OF DEMENTIA ONSET: Primary | ICD-10-CM

## 2017-10-23 DIAGNOSIS — F03.918 PSYCHOSIS IN ELDERLY WITH BEHAVIORAL DISTURBANCE: ICD-10-CM

## 2017-10-23 DIAGNOSIS — N18.30 STAGE 3 CHRONIC KIDNEY DISEASE: ICD-10-CM

## 2017-10-23 PROCEDURE — 99213 OFFICE O/P EST LOW 20 MIN: CPT | Mod: PBBFAC,PO | Performed by: NURSE PRACTITIONER

## 2017-10-23 PROCEDURE — 99999 PR PBB SHADOW E&M-EST. PATIENT-LVL III: CPT | Mod: PBBFAC,,, | Performed by: NURSE PRACTITIONER

## 2017-10-23 PROCEDURE — 99214 OFFICE O/P EST MOD 30 MIN: CPT | Mod: S$PBB,GW,, | Performed by: NURSE PRACTITIONER

## 2017-10-23 RX ORDER — CLOTRIMAZOLE AND BETAMETHASONE DIPROPIONATE 10; .64 MG/G; MG/G
CREAM TOPICAL 2 TIMES DAILY
Qty: 45 G | Refills: 11 | Status: SHIPPED | OUTPATIENT
Start: 2017-10-23

## 2017-10-23 RX ORDER — ALPRAZOLAM 0.25 MG/1
0.25 TABLET ORAL
COMMUNITY
End: 2017-10-23 | Stop reason: SDUPTHER

## 2017-10-23 RX ORDER — BUDESONIDE 1 MG/2ML
1 INHALANT ORAL
COMMUNITY

## 2017-10-23 RX ORDER — NITROGLYCERIN 0.4 MG/1
0.4 TABLET SUBLINGUAL EVERY 5 MIN PRN
Qty: 30 TABLET | Refills: 0 | Status: SHIPPED | OUTPATIENT
Start: 2017-10-23

## 2017-10-23 RX ORDER — NYSTATIN 100000 [USP'U]/G
POWDER TOPICAL 4 TIMES DAILY
Qty: 60 G | Refills: 2 | Status: SHIPPED | OUTPATIENT
Start: 2017-10-23

## 2017-10-23 RX ORDER — CLOTRIMAZOLE AND BETAMETHASONE DIPROPIONATE 10; .64 MG/G; MG/G
CREAM TOPICAL 2 TIMES DAILY
Qty: 45 G | Status: SHIPPED | OUTPATIENT
Start: 2017-10-23 | End: 2017-10-23 | Stop reason: SDUPTHER

## 2017-10-23 RX ORDER — ALPRAZOLAM 0.25 MG/1
0.25 TABLET ORAL 3 TIMES DAILY PRN
Qty: 90 TABLET | Refills: 2 | Status: SHIPPED | OUTPATIENT
Start: 2017-10-23

## 2017-10-23 NOTE — PROGRESS NOTES
"Subjective:      Patient ID: Makeda Chowdhury is a 72 y.o. female.    Chief Complaint: Annual Exam    HPI   Patient to clinic with daughter for annual exam.  Daughter admits that over the last year, patient's cognitive functioning has declined and she is now with [facility] care for Alzheimer's progression.  Daughter reports her mother has not slept through the night over the last 2 nights due to being out of Xanax.  She reports her mother rests better when taking it.  Daughter reports her mother does not communicate purposefully with others.  Assessment very limited due to patient's cognitive decline.  Patient's daughter requests refill of nitroglycerin for chest pain PRN due to current prescription being .  Patient has never needed the nitroglycerin.    Review of Systems   Constitutional: Negative for chills, fatigue and fever.   HENT: Negative.    Eyes: Negative.    Respiratory: Negative for cough, shortness of breath and wheezing.    Cardiovascular: Negative for chest pain, palpitations and leg swelling.   Skin: Negative for rash and wound.   Neurological: Negative for weakness and numbness.   Psychiatric/Behavioral: The patient is not nervous/anxious.        Objective:     BP (!) 144/85   Pulse 65   Temp 96.1 °F (35.6 °C) (Oral)   Ht 5' 6" (1.676 m)   Wt 63.5 kg (140 lb)   BMI 22.60 kg/m²     Physical Exam   Constitutional: She appears well-developed and well-nourished.   Cardiovascular: Normal rate, regular rhythm and normal heart sounds.    Pulmonary/Chest: Effort normal and breath sounds normal. No respiratory distress. She has no wheezes. She has no rales. She exhibits no tenderness.   Abdominal: Soft. Bowel sounds are normal. She exhibits no distension.   Neurological: She is disoriented.   Unable to follow commands.   Psychiatric: Cognition and memory are impaired. She is noncommunicative. She is inattentive.     Assessment:     1. Alzheimer's dementia with behavioral disturbance, " unspecified timing of dementia onset    2. 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 Stage 3 chronic kidney disease    4. Hypothyroidism due to acquired atrophy of thyroid    5. 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  Dispense: 90 tablet; Refill: 2    Other orders  -     nitroGLYCERIN (NITROSTAT) 0.4 MG SL tablet; Place 1 tablet (0.4 mg total) under the tongue every 5 (five) minutes as needed for Chest pain (up to 3 doses).  Dispense: 30 tablet; Refill: 0  -     nystatin (MYCOSTATIN) powder; Apply topically 4 (four) times daily.  Dispense: 60 g; Refill: 2  -     clotrimazole-betamethasone 1-0.05% (LOTRISONE) cream; Apply topically 2 (two) times daily.  Dispense: 45 g; Refill: 11      Return if symptoms worsen or fail to improve.

## 2017-11-14 RX ORDER — LORATADINE 10 MG/1
TABLET ORAL
Qty: 28 TABLET | Refills: 5 | Status: SHIPPED | OUTPATIENT
Start: 2017-11-14

## 2017-11-14 RX ORDER — FERROUS GLUCONATE 324(38)MG
TABLET ORAL
Qty: 28 TABLET | Refills: 5 | Status: SHIPPED | OUTPATIENT
Start: 2017-11-14

## 2017-11-14 RX ORDER — FOLIC ACID 1 MG/1
TABLET ORAL
Qty: 28 TABLET | Refills: 5 | Status: SHIPPED | OUTPATIENT
Start: 2017-11-14